# Patient Record
Sex: MALE | Race: WHITE
[De-identification: names, ages, dates, MRNs, and addresses within clinical notes are randomized per-mention and may not be internally consistent; named-entity substitution may affect disease eponyms.]

---

## 2018-02-05 ENCOUNTER — HOSPITAL ENCOUNTER (EMERGENCY)
Dept: HOSPITAL 95 - ER | Age: 33
Discharge: HOME | End: 2018-02-05
Payer: COMMERCIAL

## 2018-02-05 VITALS — BODY MASS INDEX: 31.15 KG/M2 | HEIGHT: 72 IN | WEIGHT: 230.01 LBS

## 2018-02-05 DIAGNOSIS — Z79.899: ICD-10-CM

## 2018-02-05 DIAGNOSIS — K03.81: ICD-10-CM

## 2018-02-05 DIAGNOSIS — F17.210: ICD-10-CM

## 2018-02-05 DIAGNOSIS — I10: ICD-10-CM

## 2018-02-05 DIAGNOSIS — K02.9: Primary | ICD-10-CM

## 2019-03-23 ENCOUNTER — HOSPITAL ENCOUNTER (EMERGENCY)
Dept: HOSPITAL 95 - ER | Age: 34
Discharge: HOME | End: 2019-03-23
Payer: COMMERCIAL

## 2019-03-23 VITALS — HEIGHT: 73 IN | BODY MASS INDEX: 31.81 KG/M2 | WEIGHT: 240 LBS

## 2019-03-23 DIAGNOSIS — L02.413: Primary | ICD-10-CM

## 2019-03-23 DIAGNOSIS — F17.200: ICD-10-CM

## 2019-04-30 ENCOUNTER — HOSPITAL ENCOUNTER (EMERGENCY)
Dept: HOSPITAL 95 - ER | Age: 34
Discharge: HOME | End: 2019-04-30
Payer: COMMERCIAL

## 2019-04-30 VITALS — HEIGHT: 73 IN | WEIGHT: 250 LBS | BODY MASS INDEX: 33.13 KG/M2

## 2019-04-30 DIAGNOSIS — F17.210: ICD-10-CM
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
## 2019-04-30 DIAGNOSIS — S61.412A: Primary | ICD-10-CM

## 2019-04-30 DIAGNOSIS — Z79.899: ICD-10-CM

## 2019-07-12 NOTE — NUR
SHIFT SUMMARY
PT WAS A NEW ADMIT DURING THE NIGHT, ARRIVING ON THE FLOOR AT 2300. HE WAS
ADMITTED FOR LLE CELLULITIS AFTER A DOG BITE. HE IS 35 Y/O, A&O X 4, AND ABLE
TO AMBULATE INDEPENDENTLY TO THE BATHROOM. PT REPORTED PAIN IN HIS L LEG, AND
WAS MEDICATED X2 WITH PRN FENTANYL. NO COMPLAINTS OF NAUSEA OR SOB. VITALS
REMAINED STABLE. NO OTHER ACUTE CHANGES IN PT CONDITION NOTED. WILL CONTINUE
TO MONITOR AND TREAT PER EMAR.

## 2019-07-12 NOTE — NUR
PT PLEASANT TODAY. HAS LEFT NOTES ANY TIME OUT TO SMOKE. HAS BEEN HERE FOR
MEDS AS REQUESTED. ALWAYS RESPECTFUL. NO OTHER CONCERNS AT THIS TIME. BED IN
LOW POSITION, CALL LITE IN REACH, CALLS APPROP

## 2019-07-12 NOTE — NUR
PT PLEASANT COOP A/O. STATES PAIN 9 AND WOULD BE OKAY WITH 5. STATES FENTANYL
LAST ONLY SHORT TIME. DISCUSSED LONGER ACTING PO MEDS. HE AGREES IS BETTER.
WILL DISCUSS WITH  H/R REYNALDO, NO MURMER NOTED. NO TELE. LUNGS CLEAR, RESP
EASY, UNLABORED. ON R.A. DISCUSSED QUITTING SMOKEING. NOT READY AT THIS TIME.
BT X4 LAST BM YEST. VOIDS PER BATHROOM. INDEPENDANT IN ROOM. GOES OUT TO SMOKE
REGULARLY. BED IN LOW POSITION, CALL LITE IN REACH, CALLS APPROP

## 2019-07-13 NOTE — NUR
SHIFT SUMMARY
OX4 INDEPENDENT. SPOKEN WITH ABOUT KEEPING VISITS OUTSIDE NOT LONGER THAN
45MINS. EATING WELL. VOMITING X1 THIS A.M. C/O PAIN TO LLE PO MEDS EFFECTIVE.
AFEBRILE VSS.

## 2019-07-13 NOTE — NUR
PT RETURNED TO ROOM, SPOKE WITH DR. BELLO THEN STATES "I HAVE TO GO TO MY
CAR AND ABRUPTLY LEFT THE ROOM" STATES "I'LL BE BACK IN 15-20 MINS"

## 2019-07-14 NOTE — NUR
NO ACUTE CHANGES TO PATIENT. HE CONTINUES TO GO OUT TO SMOKE AND GET HIS IV
ABX AS ORDERED. HE IS FRIENDLY WITH STAFF AND COMPLIANT WITH ALL CARES. NO
ISSUES THIS SHIFT.

## 2019-07-14 NOTE — NUR
SHIFT SUMMARY
PT ADMITTED FOR SEPSIS. FULL CODE. REGULAR DIET. CELLULITIS TO L LEG THAT PT
STATES STARTED AFTER A DOG BITE THAT OCCURRED 2 WEEKS AGO. PT ALSO HAS
SWELLING AND DRAINAGE TO THE LEFT ELBOW THAT PT SATED HAPPENED AFTER A BEE
STING. THE PT REPORTED THAT THE ELBOW DRAINED A VERY LARGE AMOUNTH OF GREENISH
WHITE FLUID THAT HAD A BAD ODOR. THIS NURSE DID NOT WITNESS ANY DRAINAGE AS
THE PT STATED THAT HE SQUEEZED IT WHILE IN THE BATHROOM. THE PT REPORTED THAT
THE ELBOW FELT MUCH BETTER FOLLOWING THE DRAINAGE. 20G IV TO R FA. INDEPENDENT
IN ROOM. TAKES MEDICATION WHOLE. PT DID VOMIT X2 YESTERDAY, CALL TO
HOSPITALIST WHO GAVE ORDER FOR ZOFRAN, ADMINISTERED X1 AND PT STATED FEELING
VERY RELIEVED. THE PT HAS APPEARED TO SLEEP COMFORTABLY MOST OF THE NIGHT WITH
NO APPARENT SIGNS OF ACUTE DISTRESS. ABLE TO MAKE NEEDS KNOWN AND CALL LIGHT
IN REACH.

## 2019-07-15 NOTE — NUR
HE IS OUTSIDE FOR THE 2ND TIME TODAY TO SMOKE. HE HAS SLEPT OR EATEN. HE SAYS
HE HAD A BM LAST NIGHT. HE DENIES ANY DIFFICULTY VOIDING. NO FEVER. IV
ANTIBIOTICS CONTINUE.

## 2019-07-15 NOTE — NUR
SHIFT SUMMARY
NO APPARENT ACUTE CHANGES NOTED SO FAR THIS SHIFT. PT STATED THAT LEFT ELBOW
"EXPLODED" AGAIN AND BEGAN TO DRAIN. PT STATED NOT WANTING DRESSING ON AREA
BECAUSE PT HAS HAD MANY AND STATES THAT IT IS BETTER TO LEAVE THE OPEN TO
DRAIN AND IT MAKES THEM FEEL BETTER. PTS L LEG CELLULITIS APPEARS TO BE
SIGNIFICANTLY IMPROVED FROM PREVIOUS NIGHT WITH REDUCE REDNESS, SWELLING, AND
HEAT. AREA STILL PINK WITH REDNESS PRIMARILY LOCATED ON THE CALF IN THE
GENERAL LOCATION OF THE ABRASIONS FROM THE DOG BITE. SHIN AND FOOT APPEAR TO
STILL BE SWOLLEN BUT APPEARS REDUCED AND PINK RATHER THEN RED. THE PT CONTIUES
TO GO OUT TO SMOKE AND GIRLFRIEND IN ROOM. CONTINUES TO BE PLEASENT,
COOPERATIVE, AND APPROPRIATE. NO APPARENT DRUG SEEKING BEHAVIORS AND DOES NOT
APPEAR TO BE UNDER THE INFLUENCE OF ANY DRUGS OR ALCOHOL AS RECIEVED IN
REPORT. PT APPEARS TO BE SLEEPING COMFORTABLY AT THIS TIME WITH NO APPARENT
SIGNS OF ACUTE DISTRESS. ABLE TO MAKE NEEDS KNOWN AND CALL LIGHT IN REACH.

## 2019-07-15 NOTE — NUR
HE IS BACK FROM SMOKING. HE HAS RECEIVED NORCO X1 TODAY. WOUND UNCHANGED
THROUGHOUT THE DAY, BUT NIGHT NURSE SAID IT LOOKED BETTER LAST NIGHT THAN THE
NIGHT BEFORE.IV ANTIBIOTICS CONTINUE. NO FEVER.

## 2019-07-16 NOTE — NUR
DISCHARGED TO HOME WITH BELONGINGS, INSTRUCTIONS AND RX'S AT 0955. HE HAS NO
COMPLAINTS EXCEPT FOR PAIN IN HIS L ANKLE AFTER HE WALKED OUT TO SMOKE THIS
MORNING. DRESSING PUT OVER L LEG WOUND AT TIME OF ASSESSMENT THIS AM.

## 2019-07-16 NOTE — NUR
SHIFT SUMMARY
PT SLEPT WELL DURING THE NIGHT. NEW IV PLACED IN RIGHT HAND DUE TO
INFILTRATION OF OTHER IV. PT MEDICATED X1 FOR PAIN THIS SHIFT. NO ACUTE EVENTS
OR CHANGES NOTED, WILL CONTINUE TO MONITOR.